# Patient Record
Sex: MALE | Race: OTHER | NOT HISPANIC OR LATINO | URBAN - METROPOLITAN AREA
[De-identification: names, ages, dates, MRNs, and addresses within clinical notes are randomized per-mention and may not be internally consistent; named-entity substitution may affect disease eponyms.]

---

## 2018-01-01 ENCOUNTER — EMERGENCY (EMERGENCY)
Facility: HOSPITAL | Age: 63
LOS: 1 days | Discharge: HOME | End: 2018-01-01
Attending: EMERGENCY MEDICINE | Admitting: EMERGENCY MEDICINE
Payer: SELF-PAY

## 2018-01-01 PROCEDURE — 99285 EMERGENCY DEPT VISIT HI MDM: CPT | Mod: 25

## 2018-01-01 PROCEDURE — 92950 HEART/LUNG RESUSCITATION CPR: CPT

## 2018-07-16 NOTE — ED PROVIDER NOTE - OBJECTIVE STATEMENT
Pt arrival at 0928.  Pt called in as notification for cardiac arrest.  Resus team awaiting pt on arrival.  As per EMS pt had been down for approximately 30 minutes s/p electrocution while at work this morning.  Asystole initial rhythm on scene and on arrival here.  Pt received 4 epi's en route.  Pt intubated with Mehrdad tube en route.  Pt vomited and likely aspirated.  On arrival here rhythm unchanged.  No pulse.  ACLS protocol continued.  Pt's tube changed out for endotracheal tube.  FS - 225.  Multiple rounds of epi given with no response.  Bicarb given.  After ~20 minutes of CPR to had no cardiac rhythm.  Bedside US showed cardiac standstill.  Pt was pronounced dead at 949am. Pt arrival at 0928.  Pt called in as notification for cardiac arrest.  Resus team awaiting pt on arrival.  As per EMS pt had been down for approximately 30 minutes s/p electrocution while at work this morning.  Initial rhythm VF - shocked x 3 then went into Asystole.  Remained in asystole and same on arrival here.  Pt received 4 epi's en route.  Pt intubated with Mehrdad tube en route.  Pt vomited and likely aspirated.  On arrival here rhythm unchanged.  No pulse.  ACLS protocol continued.  Pt's tube changed out for endotracheal tube.  FS - 225.  Multiple rounds of epi given with no response.  Bicarb given.  After ~20 minutes of CPR to had no cardiac rhythm.  Bedside US showed cardiac standstill.  Pt was pronounced dead at 949am.

## 2018-07-16 NOTE — ED PROVIDER NOTE - MEDICAL DECISION MAKING DETAILS
Cardiac arrest.  ACLS protocol continued.  ET tube changed in.  Confirmed by direct visualization with Glidescope.  No response to treatment.  Bedside US performed and shows cardiac standstill.  Pt pronounced dead at 949am.  Attempted to contact pt's next of kin.  Called his home number - [phone].  ME contacted and accepts case.  Organ donation contacted.

## 2018-07-16 NOTE — ED PROVIDER NOTE - PHYSICAL EXAMINATION
VITAL SIGNS: No pulse, asystole  CONSTITUTIONAL: Well-developed; well-nourished; unresponsive.   SKIN: Skin is warm and dry, no acute rash.  HEAD: Normocephalic; atraumatic.  EYES: pupils fixed and dilated.  No corneal reflex.   ENT: No nasal discharge.  +Vomitus in posterior pharynx.  Intubated with Mehrdad Tube.   NECK: Supple.  CARD: No pulse, no heart sounds.    RESP: Equal BS bilaterally.  ABD: Normal bowel sounds; soft; non-distended; no hepatosplenomegaly.  EXT: Normal ROM. No clubbing, cyanosis or edema.  NEURO: Unresponsive, no spontaneous movements, gagging, or agonal breaths.

## 2018-07-16 NOTE — ED ADULT NURSE NOTE - OBJECTIVE STATEMENT
notification for v-fib cardiac arrest s/p electrical shock. pt arrived by EMS in asystole, CPR in progress. intubated w/artemio tube in field. intubated with ETT tube/7inch in ED. EPi given x5 see code sheet/ pt remained in asystole the entire duration of code TOD 9:49

## 2018-07-16 NOTE — ED PROVIDER NOTE - PROGRESS NOTE DETAILS
ME contacted and accepted case, Examiner - Cam. Organ donation center contacted by MAR [facility]. Pt's daughter contacted and notified pf pt's death.  Her contact number is [phone].

## 2018-07-20 DIAGNOSIS — T75.4XXA ELECTROCUTION, INITIAL ENCOUNTER: ICD-10-CM

## 2018-07-20 DIAGNOSIS — I46.9 CARDIAC ARREST, CAUSE UNSPECIFIED: ICD-10-CM
